# Patient Record
Sex: MALE | Race: WHITE | URBAN - NONMETROPOLITAN AREA
[De-identification: names, ages, dates, MRNs, and addresses within clinical notes are randomized per-mention and may not be internally consistent; named-entity substitution may affect disease eponyms.]

---

## 2024-01-21 ENCOUNTER — APPOINTMENT (OUTPATIENT)
Dept: GENERAL RADIOLOGY | Age: 39
End: 2024-01-21

## 2024-01-21 ENCOUNTER — HOSPITAL ENCOUNTER (EMERGENCY)
Age: 39
Discharge: HOME | End: 2024-01-21
Attending: EMERGENCY MEDICINE

## 2024-01-21 VITALS
OXYGEN SATURATION: 100 % | BODY MASS INDEX: 24.11 KG/M2 | HEART RATE: 68 BPM | HEIGHT: 66 IN | SYSTOLIC BLOOD PRESSURE: 127 MMHG | DIASTOLIC BLOOD PRESSURE: 84 MMHG | WEIGHT: 150 LBS | TEMPERATURE: 98.1 F | RESPIRATION RATE: 18 BRPM

## 2024-01-21 DIAGNOSIS — S39.012A BACK STRAIN, INITIAL ENCOUNTER: ICD-10-CM

## 2024-01-21 DIAGNOSIS — R11.0 NAUSEA: Primary | ICD-10-CM

## 2024-01-21 LAB
INFLUENZA A ANTIGEN: NOT DETECTED
INFLUENZA B ANTIGEN: NOT DETECTED
SARS-COV-2 RDRP RESP QL NAA+PROBE: NOT DETECTED
SOURCE: NORMAL

## 2024-01-21 PROCEDURE — 87635 SARS-COV-2 COVID-19 AMP PRB: CPT

## 2024-01-21 PROCEDURE — 6370000000 HC RX 637 (ALT 250 FOR IP): Performed by: EMERGENCY MEDICINE

## 2024-01-21 PROCEDURE — 71045 X-RAY EXAM CHEST 1 VIEW: CPT

## 2024-01-21 PROCEDURE — 99284 EMERGENCY DEPT VISIT MOD MDM: CPT

## 2024-01-21 PROCEDURE — 87502 INFLUENZA DNA AMP PROBE: CPT

## 2024-01-21 RX ORDER — ONDANSETRON 4 MG/1
4 TABLET, FILM COATED ORAL 3 TIMES DAILY PRN
Qty: 15 TABLET | Refills: 0 | Status: SHIPPED | OUTPATIENT
Start: 2024-01-21

## 2024-01-21 RX ORDER — IBUPROFEN 600 MG/1
600 TABLET ORAL ONCE
Status: COMPLETED | OUTPATIENT
Start: 2024-01-21 | End: 2024-01-21

## 2024-01-21 RX ORDER — NAPROXEN 500 MG/1
500 TABLET ORAL 2 TIMES DAILY WITH MEALS
Qty: 14 TABLET | Refills: 0 | Status: SHIPPED | OUTPATIENT
Start: 2024-01-21 | End: 2024-01-28

## 2024-01-21 RX ORDER — ONDANSETRON 4 MG/1
4 TABLET, ORALLY DISINTEGRATING ORAL ONCE
Status: COMPLETED | OUTPATIENT
Start: 2024-01-21 | End: 2024-01-21

## 2024-01-21 RX ORDER — ACETAMINOPHEN 325 MG/1
650 TABLET ORAL ONCE
Status: COMPLETED | OUTPATIENT
Start: 2024-01-21 | End: 2024-01-21

## 2024-01-21 RX ADMIN — ONDANSETRON 4 MG: 4 TABLET, ORALLY DISINTEGRATING ORAL at 18:45

## 2024-01-21 RX ADMIN — ACETAMINOPHEN 650 MG: 325 TABLET ORAL at 18:45

## 2024-01-21 RX ADMIN — IBUPROFEN 600 MG: 600 TABLET, FILM COATED ORAL at 18:45

## 2024-01-21 ASSESSMENT — PAIN DESCRIPTION - DESCRIPTORS
DESCRIPTORS: DISCOMFORT
DESCRIPTORS: DISCOMFORT

## 2024-01-21 ASSESSMENT — PAIN SCALES - GENERAL
PAINLEVEL_OUTOF10: 8
PAINLEVEL_OUTOF10: 8

## 2024-01-21 ASSESSMENT — PAIN - FUNCTIONAL ASSESSMENT
PAIN_FUNCTIONAL_ASSESSMENT: PREVENTS OR INTERFERES SOME ACTIVE ACTIVITIES AND ADLS
PAIN_FUNCTIONAL_ASSESSMENT: 0-10

## 2024-01-21 ASSESSMENT — PAIN DESCRIPTION - LOCATION
LOCATION: BACK
LOCATION: BACK

## 2024-01-21 ASSESSMENT — PAIN DESCRIPTION - ORIENTATION
ORIENTATION: MID;LOWER
ORIENTATION: MID

## 2024-01-21 NOTE — ED PROVIDER NOTES
Emergency Department Encounter    Patient: Michael Irizarry  MRN: [id number]  : 1985  Date of Evaluation: 2024  ED Provider:  China Carranza DO    Triage Chief Complaint:   Back Pain (Mid back pain x5 days. Reports painful to shower. ) and Nausea (With weakness, reports not feeling right )    [place]:  Michael Irizarry is a 38 y.o. male with no chronic medical illnesses that presents to the emergency department stating he has been feeling unwell for the past 5 days.  He states he just does not know what it is.  He states he has had some chills some nausea some back pain some shortness of breath.  He states last night while walking to the kitchen dizzy and lightheaded but no syncopal episode.  He states he has taken Tylenol for this pain last dose this morning.  He states no anti-inflammatories.  He states no fever no vomiting no chest pain no diarrhea no dysuria hematuria.  He states  cough but he smokes cigarettes, no sore throat or runny nose or earache.  No falls injuries trauma no new medications.  He states he has been able to eat and drink.  States he feels like he cannot get enough to drink.  He states no drugs.  States occasional alcohol and does smoke cigarettes.  Patient states no history of any back problems no neck pains no numbness and tingling weakness in extremities.  States no family doctor no medicine this morning.  Here for evaluation.    ROS - see HPI, below listed is current ROS at time of my eval:  General:  No fevers, no chills, no weakness  Eyes:  No recent vison changes, no discharge  ENT:  No sore throat, no nasal congestion, no hearing changes  Cardiovascular:  No chest pain, no palpitations  Respiratory: Positive for shortness of breath,   cough, no wheezing  Gastrointestinal:  No pain, positive for nausea, no vomiting, no diarrhea  Musculoskeletal: Positive for back pain, no muscle pain, no joint pain  Skin:  No rash, no pruritis, no easy bruising  Neurologic:  No speech

## 2024-01-22 NOTE — ED PROVIDER NOTES
I did going to try to introduce myself to the patient upon signout from Dr. Carranza but the patient states he does not want to wait for results and would like to go home.  He did not want to discuss his symptoms and states that he will follow-up but he wants to leave immediately.  He states he does not like being in the room for long periods of time and he feels like he has been there for a while.  He states that \" it is not you it is me and I am sorry but I have to go.\"  Significant other bedside had stated that they would like a call regarding the chest x-ray results.  They understand that I was not able to evaluate him so I cannot make any further recommendations at this point but will give him symptom management.  I did tell him that the viral swabs did come back negative and I reviewed the chest x-ray and it did not show any acute process.  They expressed understanding.  Vital signs are reassuring at the time of discharge the patient appears to be alert and oriented.  He expresses clear understanding of the limitations of the exam right now and still wishes to be discharged home.  He does seem medically stable at this time and is clearly able to make his own decisions but will give him strict return precautions as I was not able to fully evaluate him.     Reji Mir MD  01/21/24 2030

## 2024-01-22 NOTE — ED NOTES
Dr Mir in to discuss test results and plan for discharge. Discharge instructions given to pt and verbalizes understanding.  
[Patient Intake Form Reviewed] : Patient intake form was reviewed

## 2024-01-22 NOTE — DISCHARGE INSTRUCTIONS
Please go to [facility] or call [phone] to find a new provider.     Or use QR code below: